# Patient Record
Sex: MALE | Race: WHITE | NOT HISPANIC OR LATINO | Employment: OTHER | URBAN - METROPOLITAN AREA
[De-identification: names, ages, dates, MRNs, and addresses within clinical notes are randomized per-mention and may not be internally consistent; named-entity substitution may affect disease eponyms.]

---

## 2021-04-08 DIAGNOSIS — Z23 ENCOUNTER FOR IMMUNIZATION: ICD-10-CM

## 2021-04-19 ENCOUNTER — TRANSCRIBE ORDERS (OUTPATIENT)
Dept: ADMINISTRATIVE | Facility: HOSPITAL | Age: 66
End: 2021-04-19

## 2021-04-19 DIAGNOSIS — C68.9 UROTHELIAL CANCER (HCC): Primary | ICD-10-CM

## 2021-04-26 ENCOUNTER — HOSPITAL ENCOUNTER (OUTPATIENT)
Dept: RADIOLOGY | Facility: HOSPITAL | Age: 66
Discharge: HOME | End: 2021-04-26
Payer: MEDICARE

## 2021-04-26 ENCOUNTER — HOSPITAL ENCOUNTER (OUTPATIENT)
Dept: CT IMAGING | Facility: HOSPITAL | Age: 66
Discharge: HOME | End: 2021-04-26
Payer: MEDICARE

## 2021-04-26 ENCOUNTER — TRANSCRIBE ORDERS (OUTPATIENT)
Dept: ADMINISTRATIVE | Facility: HOSPITAL | Age: 66
End: 2021-04-26

## 2021-04-26 DIAGNOSIS — M79.642 PAIN IN BOTH HANDS: ICD-10-CM

## 2021-04-26 DIAGNOSIS — M79.641 PAIN IN BOTH HANDS: ICD-10-CM

## 2021-04-26 DIAGNOSIS — M79.641 PAIN IN BOTH HANDS: Primary | ICD-10-CM

## 2021-04-26 DIAGNOSIS — C68.9 UROTHELIAL CANCER (HCC): ICD-10-CM

## 2021-04-26 DIAGNOSIS — M79.642 PAIN IN BOTH HANDS: Primary | ICD-10-CM

## 2021-04-26 PROCEDURE — 71250 CT THORAX DX C-: CPT

## 2021-04-26 PROCEDURE — G1004 CDSM NDSC: HCPCS

## 2021-04-26 PROCEDURE — 73130 X-RAY EXAM OF HAND: CPT

## 2021-04-26 PROCEDURE — 74176 CT ABD & PELVIS W/O CONTRAST: CPT

## 2021-11-30 ENCOUNTER — OFFICE VISIT (OUTPATIENT)
Dept: CARDIOLOGY CLINIC | Facility: CLINIC | Age: 66
End: 2021-11-30
Payer: MEDICARE

## 2021-11-30 VITALS
BODY MASS INDEX: 22.96 KG/M2 | DIASTOLIC BLOOD PRESSURE: 80 MMHG | HEIGHT: 71 IN | WEIGHT: 164 LBS | SYSTOLIC BLOOD PRESSURE: 120 MMHG | HEART RATE: 65 BPM

## 2021-11-30 DIAGNOSIS — E78.5 DYSLIPIDEMIA: ICD-10-CM

## 2021-11-30 DIAGNOSIS — I49.3 PVC (PREMATURE VENTRICULAR CONTRACTION): Primary | ICD-10-CM

## 2021-11-30 DIAGNOSIS — I48.0 PAROXYSMAL A-FIB (HCC): ICD-10-CM

## 2021-11-30 PROCEDURE — 93000 ELECTROCARDIOGRAM COMPLETE: CPT | Performed by: INTERNAL MEDICINE

## 2021-11-30 PROCEDURE — 99204 OFFICE O/P NEW MOD 45 MIN: CPT | Performed by: INTERNAL MEDICINE

## 2021-11-30 RX ORDER — FLECAINIDE ACETATE 50 MG/1
50 TABLET ORAL 2 TIMES DAILY
Qty: 180 TABLET | Refills: 5 | Status: SHIPPED | OUTPATIENT
Start: 2021-11-30 | End: 2022-08-09

## 2021-11-30 RX ORDER — METOPROLOL SUCCINATE 25 MG/1
12.5 TABLET, EXTENDED RELEASE ORAL DAILY
COMMUNITY
End: 2021-11-30 | Stop reason: DRUGHIGH

## 2021-11-30 RX ORDER — FLECAINIDE ACETATE 50 MG/1
50 TABLET ORAL ONCE
COMMUNITY
End: 2021-11-30 | Stop reason: SDUPTHER

## 2021-11-30 RX ORDER — ATORVASTATIN CALCIUM 20 MG/1
20 TABLET, FILM COATED ORAL DAILY
COMMUNITY

## 2022-04-26 ENCOUNTER — HOSPITAL ENCOUNTER (OUTPATIENT)
Dept: ULTRASOUND IMAGING | Facility: HOSPITAL | Age: 67
Discharge: HOME | End: 2022-04-26
Payer: MEDICARE

## 2022-04-26 ENCOUNTER — HOSPITAL ENCOUNTER (OUTPATIENT)
Dept: CT IMAGING | Facility: HOSPITAL | Age: 67
Discharge: HOME | End: 2022-04-26
Payer: MEDICARE

## 2022-04-26 DIAGNOSIS — Z90.6 ACQUIRED ABSENCE OF OTHER PARTS OF URINARY TRACT: ICD-10-CM

## 2022-04-26 DIAGNOSIS — N50.89 OTHER SPECIFIED DISORDERS OF THE MALE GENITAL ORGANS: ICD-10-CM

## 2022-04-26 PROCEDURE — 74176 CT ABD & PELVIS W/O CONTRAST: CPT

## 2022-04-26 PROCEDURE — 71250 CT THORAX DX C-: CPT

## 2022-04-26 PROCEDURE — G1004 CDSM NDSC: HCPCS

## 2022-04-26 PROCEDURE — 76870 US EXAM SCROTUM: CPT

## 2022-08-09 ENCOUNTER — OFFICE VISIT (OUTPATIENT)
Dept: CARDIOLOGY CLINIC | Facility: CLINIC | Age: 67
End: 2022-08-09
Payer: MEDICARE

## 2022-08-09 VITALS
HEIGHT: 71 IN | HEART RATE: 66 BPM | BODY MASS INDEX: 22.82 KG/M2 | DIASTOLIC BLOOD PRESSURE: 86 MMHG | SYSTOLIC BLOOD PRESSURE: 122 MMHG | WEIGHT: 163 LBS

## 2022-08-09 DIAGNOSIS — I49.3 PVC (PREMATURE VENTRICULAR CONTRACTION): Primary | ICD-10-CM

## 2022-08-09 DIAGNOSIS — I48.0 PAROXYSMAL A-FIB (HCC): ICD-10-CM

## 2022-08-09 PROCEDURE — 93000 ELECTROCARDIOGRAM COMPLETE: CPT | Performed by: INTERNAL MEDICINE

## 2022-08-09 PROCEDURE — 99214 OFFICE O/P EST MOD 30 MIN: CPT | Performed by: INTERNAL MEDICINE

## 2022-08-09 RX ORDER — FLECAINIDE ACETATE 50 MG/1
50 TABLET ORAL 3 TIMES DAILY
Start: 2022-08-09

## 2022-08-09 NOTE — PROGRESS NOTES
Patient ID: Anna Hess  is a 77 y o  male  Plan:      Paroxysmal A-fib ([address] )  Sounds like he had a brief spell 6 weeks ago or so  PVC (premature ventricular contraction)  Not as bad as earlier in life but not as good as more recent either  Will try increasing the dose of flecainide  Dyslipidemia  Tolerating statin tx  Follow up Plan/Other summary comments:  Return in about 1 year (around 8/9/2023)  HPI: Patient seen in f/u regarding the above issues  NO change in exertional capacity  Spells of afib, brief, and PVCs are describe d      He has a history bladder cancer and paroxysmal atrial fibrillation  Paroxysmal atrial fibrillation occurred when he was septic related to bladder treatment  There has been no recurrence in many years  He also has had symptomatic PVCs  It is additionally notable that coronary angiography in 2018 was normal   Flecainide was very useful for this  Results for orders placed or performed in visit on 08/09/22   POCT ECG    Impression    NSR  Low voltage  Borderline left axis  Most recent or relevant cardiac/vascular testing:    Coronary angiography 2018:  Normal     SHIVA cardioversion 01/05/2018:  Low normal ejection fraction  Moderate left atrial enlargement  Patent foramen ovale with left-to-right shunt  Past Surgical History:   Procedure Laterality Date    CARDIAC CATHETERIZATION  01/03/2018    no significant CAD, EF 45%    CARDIOVERSION  01/05/2018    successful 200 joules     CMP: No results found for: NA, K, CL, CO2, BUN, CREATININE, GLUCOSE, EGFR    Lipid Profile: No results found for: CHOL, TRIG, HDL, LDL      Review of Systems   10  point ROS  was otherwise non pertinent or negative except as per HPI or as below  Gait: Normal          Objective:     /86   Pulse 66   Ht 5' 11" (1 803 m)   Wt 73 9 kg (163 lb)   BMI 22 73 kg/m²     PHYSICAL EXAM:    General:  Normal appearance in no distress  Eyes:  Anicteric    Oral mucosa:  Moist   Neck:  No JVD  Carotid upstrokes are brisk without bruits  No masses  Chest:  Clear to auscultation  Cardiac:  No palpable PMI  Normal S1 and S2  No murmur gallop or rub  Abdomen:  Soft and nontender  No palpable organomegaly or aortic enlargement  Extremities:  No peripheral edema  Musculoskeletal:  Symmetric  Vascular:  Femoral pulses are brisk without bruits  Popliteal pulses are intact bilaterally  Pedal pulses are intact  Neuro:  Grossly symmetric  Psych:  Alert and oriented x3          Current Outpatient Medications:     atorvastatin (LIPITOR) 20 mg tablet, Take 20 mg by mouth daily, Disp: , Rfl:     flecainide (TAMBOCOR) 50 mg tablet, Take 1 tablet (50 mg total) by mouth 3 (three) times a day and as needed, Disp: , Rfl:   No Known Allergies  Past Medical History:   Diagnosis Date    Hyperlipidemia     Malignant neoplasm of bladder neck     Nonrheumatic mitral valve regurgitation     [facility]non[facility]ST elevated [facility]     PAF (paroxysmal atrial fibrillation) ([facility])     s/p successful cardioversion 1/5/2018    PVC (premature ventricular contraction)     Stress-induced cardiomyopathy 2018           Social History     Tobacco Use   Smoking Status Never Smoker   Smokeless Tobacco Never Used

## 2022-08-09 NOTE — ASSESSMENT & PLAN NOTE
Not as bad as earlier in life but not as good as more recent either  Will try increasing the dose of flecainide

## 2023-01-23 DIAGNOSIS — I48.0 PAROXYSMAL A-FIB (HCC): ICD-10-CM

## 2023-01-23 DIAGNOSIS — I49.3 PVC (PREMATURE VENTRICULAR CONTRACTION): ICD-10-CM

## 2023-01-23 RX ORDER — FLECAINIDE ACETATE 50 MG/1
50 TABLET ORAL 2 TIMES DAILY
Qty: 180 TABLET | Refills: 3 | Status: SHIPPED | OUTPATIENT
Start: 2023-01-23

## 2023-11-06 ENCOUNTER — OFFICE VISIT (OUTPATIENT)
Dept: CARDIOLOGY CLINIC | Facility: CLINIC | Age: 68
End: 2023-11-06
Payer: MEDICARE

## 2023-11-06 VITALS
SYSTOLIC BLOOD PRESSURE: 116 MMHG | WEIGHT: 161 LBS | BODY MASS INDEX: 22.54 KG/M2 | HEART RATE: 67 BPM | HEIGHT: 71 IN | DIASTOLIC BLOOD PRESSURE: 78 MMHG

## 2023-11-06 DIAGNOSIS — I49.3 PVC (PREMATURE VENTRICULAR CONTRACTION): ICD-10-CM

## 2023-11-06 DIAGNOSIS — I48.0 PAROXYSMAL A-FIB (HCC): Primary | ICD-10-CM

## 2023-11-06 DIAGNOSIS — E78.5 DYSLIPIDEMIA: ICD-10-CM

## 2023-11-06 PROCEDURE — 99214 OFFICE O/P EST MOD 30 MIN: CPT | Performed by: INTERNAL MEDICINE

## 2023-11-06 PROCEDURE — 93000 ELECTROCARDIOGRAM COMPLETE: CPT | Performed by: INTERNAL MEDICINE

## 2023-11-06 NOTE — ASSESSMENT & PLAN NOTE
Sounds like he has very brief spells but never sustained more than 2 hours but generally much less. Functional during spells. [id number] = 1. I reiterated being consistent with flecainide. We talked about other options. For now he is going to a [employer] [facility] and send me strips.

## 2023-11-06 NOTE — PROGRESS NOTES
Patient ID: Ruby Tsai. is a 76 y.o. male. Plan:      Paroxysmal A-fib ([address])  Sounds like he has very brief spells but never sustained more than 2 hours but generally much less. Functional during spells. [id number] = 1. I reiterated being consistent with flecainide. We talked about other options. For now he is going to a [employer] [facility] and send me strips. PVC (premature ventricular contraction)  Some spells may be this. [employer] will be helpful. Dyslipidemia  Tolerating statin therapy. Follow up Plan/Other summary comments:  Return in about 1 year (around 11/6/2024). He will send me rhythm strips in the interim. HPI: Patient is seen in follow-up today regarding the above. Since the last visit in general he has felt well without any change in exertional tolerance. Intermittent spells of chaotic heart rate have been present although never fast.  Most spells are very short but on rare occasion he will have a sustained spell of perhaps 2 hours. He has a history bladder cancer and paroxysmal atrial fibrillation. Paroxysmal atrial fibrillation occurred when he was septic related to bladder treatment. He also has had symptomatic PVCs. It is additionally notable that coronary angiography in 2018 was normal.  Flecainide was very useful for this initially but a little bit less so over the past year or 2. Results for orders placed or performed in visit on 11/06/23   POCT ECG    Impression    Normal sinus rhythm. Completely normal.  No QT prolongation. Most recent or relevant cardiac/vascular testing:       Coronary angiography 2018:  Normal.    SHIVA cardioversion 01/05/2018:  Low normal ejection fraction. Moderate left atrial enlargement. Patent foramen ovale with left-to-right shunt.     Past Surgical History:   Procedure Laterality Date    CARDIAC CATHETERIZATION  01/03/2018    no significant CAD, EF 45%    CARDIOVERSION  01/05/2018    successful 200 joules Lipid Profile: No results found for: "CHOL", "TRIG", "HDL", "LDL"      Review of Systems   10  point ROS  was otherwise non pertinent or negative except as per HPI or as below. Gait:  Normal.        Objective:     /78   Pulse 67   Ht 5' 11" (1.803 m)   Wt 73 kg (161 lb)   BMI 22.45 kg/m²     PHYSICAL EXAM:    General:  Normal appearance in no distress. Eyes:  Anicteric. Oral mucosa:  Moist.  Neck:  No JVD. Carotid upstrokes are brisk without bruits. No masses. Chest:  Clear to auscultation. Cardiac:  No palpable PMI. Normal S1 and S2. No murmur gallop or rub. Abdomen:  Soft and nontender. No palpable organomegaly or aortic enlargement. Extremities:  No peripheral edema. Musculoskeletal:  Symmetric. Vascular:  Femoral pulses are brisk without bruits. Popliteal pulses are intact bilaterally. Pedal pulses are intact. Neuro:  Grossly symmetric. Psych:  Alert and oriented x3.         Current Outpatient Medications:     atorvastatin (LIPITOR) 20 mg tablet, Take 20 mg by mouth daily, Disp: , Rfl:     flecainide (TAMBOCOR) 50 mg tablet, Take 1 tablet (50 mg total) by mouth 2 (two) times a day, Disp: 180 tablet, Rfl: 3  No Known Allergies  Past Medical History:   Diagnosis Date    Hyperlipidemia     Malignant neoplasm of bladder neck     Nonrheumatic mitral valve regurgitation     NSTEMI (non-ST elevated myocardial infarction) ([facility])     PAF (paroxysmal atrial fibrillation) ([facility])     s/p successful cardioversion 1/5/2018    PVC (premature ventricular contraction)     Stress-induced cardiomyopathy 2018           Social History     Tobacco Use   Smoking Status Never   Smokeless Tobacco Never

## 2024-01-24 DIAGNOSIS — I48.0 PAROXYSMAL A-FIB (HCC): ICD-10-CM

## 2024-01-24 DIAGNOSIS — I49.3 PVC (PREMATURE VENTRICULAR CONTRACTION): ICD-10-CM

## 2024-01-24 RX ORDER — FLECAINIDE ACETATE 50 MG/1
50 TABLET ORAL 2 TIMES DAILY
Qty: 180 TABLET | Refills: 3 | Status: SHIPPED | OUTPATIENT
Start: 2024-01-24

## 2024-07-29 PROBLEM — C67.0 MALIGNANT NEOPLASM OF TRIGONE OF URINARY BLADDER (HCC): Status: ACTIVE | Noted: 2024-07-29

## 2024-11-18 ENCOUNTER — OFFICE VISIT (OUTPATIENT)
Dept: CARDIOLOGY CLINIC | Facility: CLINIC | Age: 69
End: 2024-11-18
Payer: MEDICARE

## 2024-11-18 VITALS
WEIGHT: 162 LBS | SYSTOLIC BLOOD PRESSURE: 124 MMHG | RESPIRATION RATE: 14 BRPM | DIASTOLIC BLOOD PRESSURE: 84 MMHG | HEIGHT: 71 IN | HEART RATE: 75 BPM | BODY MASS INDEX: 22.68 KG/M2

## 2024-11-18 DIAGNOSIS — E78.5 DYSLIPIDEMIA: ICD-10-CM

## 2024-11-18 DIAGNOSIS — I49.3 PVC (PREMATURE VENTRICULAR CONTRACTION): ICD-10-CM

## 2024-11-18 DIAGNOSIS — I48.0 PAROXYSMAL A-FIB (HCC): Primary | ICD-10-CM

## 2024-11-18 PROCEDURE — 99214 OFFICE O/P EST MOD 30 MIN: CPT | Performed by: INTERNAL MEDICINE

## 2024-11-18 PROCEDURE — 93000 ELECTROCARDIOGRAM COMPLETE: CPT | Performed by: INTERNAL MEDICINE

## 2024-11-18 NOTE — PROGRESS NOTES
" Patient ID: Raman Carrillo Jr. is a 69 y.o. male.        Plan:      Assessment & Plan  Paroxysmal A-fib (HCC)  Relatively quiescent over the past year.  Dyslipidemia  Tolerating statin tx.  PVC (premature ventricular contraction)  No recent symptoms.      Follow up Plan/Other summary comments:  Intervals are normal.  Return in about 1 year (around 11/18/2025).    HPI:   Patient seen in f/u regarding the above issues.  No CP.  No dyspnea.  No syncope or near syncope.  No palpitations.  He remains active restoring cars.      He has a history bladder cancer and paroxysmal atrial fibrillation.  Paroxysmal atrial fibrillation occurred when he was septic related to bladder treatment.    He also has had symptomatic PVCs.  It is additionally notable that coronary angiography in 2018 was normal.  Flecainide was very useful for this initially but a little bit less so over the past year or 2.    Results for orders placed or performed in visit on 11/18/24   POCT ECG    Impression    NSR. WNL.         Most recent or relevant cardiac/vascular testing:    Coronary angiography 2018:  Normal.    SHIVA cardioversion 01/05/2018:  Low normal ejection fraction.  Moderate left atrial enlargement.  Patent foramen ovale with left-to-right shunt.      Past Surgical History:   Procedure Laterality Date    CARDIAC CATHETERIZATION  01/03/2018    no significant CAD, EF 45%    CARDIOVERSION  01/05/2018    successful 200 joules    COLONOSCOPY  04/14/2014    Mild diverticulosis throughout.  No specimens.  Dr. Ingram       Lipid Profile: Reviewed      Review of Systems   10  point ROS  was otherwise non pertinent or negative except as per HPI or as below.   Gait:  Normal.        Objective:     /84   Pulse 75   Resp 14   Ht 5' 11\" (1.803 m)   Wt 73.5 kg (162 lb)   BMI 22.59 kg/m²     PHYSICAL EXAM:    General:  Normal appearance in no distress.  Eyes:  Anicteric.  Oral mucosa:  Moist.  Neck:  No JVD. Carotid upstrokes are brisk without " bruits.  No masses.  Chest:  Clear to auscultation.  Cardiac:  No palpable PMI.  Normal S1 and S2.  No murmur gallop or rub.  Abdomen:  Soft and nontender. No palpable organomegaly or aortic enlargement.  Extremities:  No peripheral edema.  Musculoskeletal:  Symmetric.   Vascular:  Femoral pulses are brisk without bruits.  Popliteal pulses are intact bilaterally.   Pedal pulses are intact.  Neuro:  Grossly symmetric.  Psych:  Alert and oriented x3.      Meds reviewed.    Past Medical History:   Diagnosis Date    Hyperlipidemia     Malignant neoplasm of bladder neck     Nonrheumatic mitral valve regurgitation     NSTEMI (non-ST elevated myocardial infarction) ([facility])     PAF (paroxysmal atrial fibrillation) ([facility])     s/p successful cardioversion 1/5/2018    PVC (premature ventricular contraction)     Stress-induced cardiomyopathy 2018           Social History     Tobacco Use   Smoking Status Never   Smokeless Tobacco Never

## 2025-01-20 DIAGNOSIS — I48.0 PAROXYSMAL A-FIB (HCC): ICD-10-CM

## 2025-01-20 DIAGNOSIS — I49.3 PVC (PREMATURE VENTRICULAR CONTRACTION): ICD-10-CM

## 2025-01-21 RX ORDER — FLECAINIDE ACETATE 50 MG/1
50 TABLET ORAL 2 TIMES DAILY
Qty: 180 TABLET | Refills: 3 | Status: SHIPPED | OUTPATIENT
Start: 2025-01-21

## 2025-01-23 ENCOUNTER — HOSPITAL ENCOUNTER (OUTPATIENT)
Dept: CT IMAGING | Facility: HOSPITAL | Age: 70
End: 2025-01-23
Payer: MEDICARE

## 2025-01-23 DIAGNOSIS — C67.9 MALIGNANT NEOPLASM OF URINARY BLADDER, UNSPECIFIED SITE (HCC): ICD-10-CM

## 2025-01-23 PROCEDURE — 71250 CT THORAX DX C-: CPT

## 2025-01-23 PROCEDURE — 74176 CT ABD & PELVIS W/O CONTRAST: CPT
